# Patient Record
Sex: FEMALE | Race: WHITE
[De-identification: names, ages, dates, MRNs, and addresses within clinical notes are randomized per-mention and may not be internally consistent; named-entity substitution may affect disease eponyms.]

---

## 2021-05-06 ENCOUNTER — HOSPITAL ENCOUNTER (EMERGENCY)
Dept: HOSPITAL 61 - ER | Age: 41
Discharge: HOME | End: 2021-05-06
Payer: SELF-PAY

## 2021-05-06 VITALS — DIASTOLIC BLOOD PRESSURE: 78 MMHG | SYSTOLIC BLOOD PRESSURE: 132 MMHG

## 2021-05-06 VITALS — WEIGHT: 169.76 LBS | HEIGHT: 63 IN | BODY MASS INDEX: 30.08 KG/M2

## 2021-05-06 DIAGNOSIS — F17.200: ICD-10-CM
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
## 2021-05-06 DIAGNOSIS — S51.852A: Primary | ICD-10-CM
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
## 2021-05-06 PROCEDURE — 99284 EMERGENCY DEPT VISIT MOD MDM: CPT

## 2021-05-06 PROCEDURE — 73090 X-RAY EXAM OF FOREARM: CPT

## 2021-05-06 NOTE — RAD
Study: XR FOREARM_LEFT 2 VIEWS



Indication: Dog bite.



Comparison: None.



Findings:



Laceration injury seen at the radial aspect of the distal forearm. No retained radiopaque foreign bod
y. No acute fracture or malalignment.



Impression:



No acute osseous abnormality seen in the setting of laceration injury centered at the radial aspect o
f the distal forearm. No retained radiopaque foreign body.



Electronically signed by: JENNA WONG MD (5/6/2021 7:52 PM) [facility]

## 2021-05-06 NOTE — PHYS DOC
Past Medical History


Past Medical History:  No Pertinent History


Past Surgical History:  No Surgical History


Smoking Status:  Current Every Day Smoker


Alcohol Use:  Occasionally


Drug Use:  None





General Adult


EDM:


Chief Complaint:  ANIMAL BITE





HPI:


HPI:





Patient is a 40  year old male patient presented to the ED today with dog bites 

to the left forearm.  Patient is right-handed.





Review of Systems:


Review of Systems:


Constitutional:   Denies fever or chills. []


Musculoskeletal:   Denies back pain or joint pain. [] 


Integument:   Reports dog bite to the left forearm


Neurologic:   Denies headache, focal weakness or sensory changes. [] 


Psychiatric:  Denies depression or anxiety. []





Heart Score:


C/O Chest Pain:  N/A


Risk Factors:


Risk Factors:  DM, Current or recent (<one month) smoker, HTN, HLP, family 

history of CAD, obesity.


Risk Scores:


Score 0 - 3:  2.5% MACE over next 6 weeks - Discharge Home


Score 4 - 6:  20.3% MACE over next 6 weeks - Admit for Clinical Observation


Score 7 - 10:  72.7% MACE over next 6 weeks - Early Invasive Strategies





Current Medications:





Current Medications








 Medications


  (Trade)  Dose


 Ordered  Sig/Zoran  Start Time


 Stop Time Status Last Admin


Dose Admin


 


 Acetaminophen/


 Hydrocodone Bitart


  (Lortab 5/325)  2 tab  1X  ONCE  5/6/21 19:45


 5/6/21 19:46 DC 5/6/21 19:46


2 TAB


 


 Amoxicillin/


 Clavulanate


 Potassium


  (Augmentin 875/


 125mg)  1 tab  1X  ONCE  5/6/21 19:45


 5/6/21 19:46 DC 5/6/21 19:47


1 TAB


 


 Naproxen


  (Naprosyn)  500 mg  1X  ONCE  5/6/21 19:33


 5/6/21 19:41 DC 5/6/21 19:47


500 MG











Allergies:


Allergies:





Allergies








Coded Allergies Type Severity Reaction Last Updated Verified


 


  No Known Drug Allergies    1/22/14 No











Physical Exam:


PE:





Constitutional: Well developed, well nourished, no acute distress, non-toxic 

appearance. []


Skin: Warm, dry, left forearm distal and with 4 puncture wounds on the ventral 

as well as dorsal aspect consistent with dog bites.


Back: No tenderness, no CVA tenderness. [] 


Extremities: No tenderness, no cyanosis, no clubbing, ROM intact, no edema. [] 


Neurologic: Alert and oriented X 3, normal motor function, normal sensory 

function, no focal deficits noted. []


Psychologic: Affect normal, judgement normal, mood normal. []





Current Patient Data:


Vital Signs:





                                   Vital Signs








  Date Time  Temp Pulse Resp B/P (MAP) Pulse Ox O2 Delivery O2 Flow Rate FiO2


 


5/6/21 19:28  86 12 132/78 (96) 96 Room Air  











EKG:


EKG:


[]





Radiology/Procedures:


Radiology/Procedures:


[]REASON: dog bite


PROCEDURE: FOREARM LEFT





Study: XR FOREARM_LEFT 2 VIEWS





Indication: Dog bite.





Comparison: None.





Findings:





Laceration injury seen at the radial aspect of the distal forearm. No retained 

radiopaque foreign body. No acute fracture or malalignment.





Impression:





No acute osseous abnormality seen in the setting of laceration injury centered 

at the radial aspect of the distal forearm. No retained radiopaque foreign body.





Electronically signed by: JENNA WONG MD (5/6/2021 7:52 PM) [facility]














DICTATED and SIGNED BY:     JENNA WONG MD


DATE:     05/06/21 [id number] 0





Course & Med Decision Making:


Course & Med Decision Making


Pertinent Labs and Imaging studies reviewed. (See chart for details)





This is a 40-year-old female patient presenting to the ED today with dog bites 

to left forearm.  Left forearm x-rays interpreted by radiologist are negative 

for any acute findings.  Dog bites were cleaned, covered nonstick dressing.  

Discharged in Augmentin.  Tetanus updated.  Wound care instructions and return 

precautions provided first dose of Augmentin given in the ED.





Dragon Disclaimer:


Dragon Disclaimer:


This electronic medical record was generated, in whole or in part, using a voice

 recognition dictation system.





Departure


Departure


Impression:  


   Primary Impression:  


   Dog bite of left upper arm


   Qualified Codes:  S41.152A - Open bite of left upper arm, initial encounter; 

   W54.0XXA - Bitten by dog, initial encounter


Disposition:  01 HOME / SELF CARE / HOMELESS


Condition:  STABLE


Referrals:  


NO PCP (PCP)


Follow-up with your doctor in 1 to 2 weeks


Patient Instructions:  Animal Bite





Additional Instructions:  


You were evaluated in the emergency room for dog bite to the left forearm.  

Continue to clean the areas with regular soap and water once or twice a day.  We

 would like the areas left open to air if not draining if draining cover them.  

Apply Neosporin to the dog bites twice a day.  Take the prescribed antibiotics 

until completed.  Follow-up with your doctor in 1 week.  Please return to the ED

 at any point wound condition worsens.


Scripts


Hydrocodone/Acetaminophen (Hydrocodone-Acetamin 5-325 mg) 1 Each Tablet


1 EACH PO Q6HRS, #10 TAB


   Prov: LUIS ALFREDO APRN         5/6/21 


Amoxicillin (AMOXICILLIN) 875 Mg Tablet


1 TAB PO BID, #20 TAB


   Prov: LUIS ALFREDO APRN         5/6/21











LUIS ALFREDO             May 6, 2021 19:56